# Patient Record
Sex: MALE | Race: WHITE | NOT HISPANIC OR LATINO | Employment: FULL TIME | URBAN - METROPOLITAN AREA
[De-identification: names, ages, dates, MRNs, and addresses within clinical notes are randomized per-mention and may not be internally consistent; named-entity substitution may affect disease eponyms.]

---

## 2022-09-10 ENCOUNTER — APPOINTMENT (OUTPATIENT)
Dept: CT IMAGING | Facility: HOSPITAL | Age: 42
End: 2022-09-10

## 2022-09-10 ENCOUNTER — HOSPITAL ENCOUNTER (EMERGENCY)
Facility: HOSPITAL | Age: 42
Discharge: HOME | End: 2022-09-10
Attending: EMERGENCY MEDICINE | Admitting: EMERGENCY MEDICINE

## 2022-09-10 VITALS
SYSTOLIC BLOOD PRESSURE: 139 MMHG | TEMPERATURE: 98.4 F | HEIGHT: 68 IN | WEIGHT: 190 LBS | DIASTOLIC BLOOD PRESSURE: 86 MMHG | RESPIRATION RATE: 15 BRPM | BODY MASS INDEX: 28.79 KG/M2 | OXYGEN SATURATION: 98 % | HEART RATE: 82 BPM

## 2022-09-10 DIAGNOSIS — R55 SYNCOPE AND COLLAPSE: Primary | ICD-10-CM

## 2022-09-10 LAB
ALBUMIN SERPL-MCNC: 3.8 G/DL (ref 3.5–5.2)
ALBUMIN/GLOB SERPL: 1.8 G/DL
ALP SERPL-CCNC: 98 U/L (ref 39–117)
ALT SERPL W P-5'-P-CCNC: 57 U/L (ref 1–41)
AMPHET+METHAMPHET UR QL: NEGATIVE
AMPHETAMINES UR QL: NEGATIVE
ANION GAP SERPL CALCULATED.3IONS-SCNC: 10.5 MMOL/L (ref 5–15)
AST SERPL-CCNC: 40 U/L (ref 1–40)
BACTERIA UR QL AUTO: ABNORMAL /HPF
BARBITURATES UR QL SCN: NEGATIVE
BASOPHILS # BLD AUTO: 0.06 10*3/MM3 (ref 0–0.2)
BASOPHILS NFR BLD AUTO: 0.7 % (ref 0–1.5)
BENZODIAZ UR QL SCN: NEGATIVE
BILIRUB SERPL-MCNC: 0.6 MG/DL (ref 0–1.2)
BILIRUB UR QL STRIP: NEGATIVE
BUN SERPL-MCNC: 21 MG/DL (ref 6–20)
BUN/CREAT SERPL: 21.6 (ref 7–25)
BUPRENORPHINE SERPL-MCNC: NEGATIVE NG/ML
CALCIUM SPEC-SCNC: 8.2 MG/DL (ref 8.6–10.5)
CANNABINOIDS SERPL QL: NEGATIVE
CHLORIDE SERPL-SCNC: 104 MMOL/L (ref 98–107)
CLARITY UR: CLEAR
CO2 SERPL-SCNC: 25.5 MMOL/L (ref 22–29)
COCAINE UR QL: NEGATIVE
COLOR UR: YELLOW
CREAT SERPL-MCNC: 0.97 MG/DL (ref 0.76–1.27)
DEPRECATED RDW RBC AUTO: 38.4 FL (ref 37–54)
EGFRCR SERPLBLD CKD-EPI 2021: 100.6 ML/MIN/1.73
EOSINOPHIL # BLD AUTO: 0.22 10*3/MM3 (ref 0–0.4)
EOSINOPHIL NFR BLD AUTO: 2.5 % (ref 0.3–6.2)
ERYTHROCYTE [DISTWIDTH] IN BLOOD BY AUTOMATED COUNT: 11.8 % (ref 12.3–15.4)
GLOBULIN UR ELPH-MCNC: 2.1 GM/DL
GLUCOSE SERPL-MCNC: 100 MG/DL (ref 65–99)
GLUCOSE UR STRIP-MCNC: NEGATIVE MG/DL
HCT VFR BLD AUTO: 43 % (ref 37.5–51)
HGB BLD-MCNC: 15 G/DL (ref 13–17.7)
HGB UR QL STRIP.AUTO: ABNORMAL
HYALINE CASTS UR QL AUTO: ABNORMAL /LPF
IMM GRANULOCYTES # BLD AUTO: 0.03 10*3/MM3 (ref 0–0.05)
IMM GRANULOCYTES NFR BLD AUTO: 0.3 % (ref 0–0.5)
KETONES UR QL STRIP: NEGATIVE
LEUKOCYTE ESTERASE UR QL STRIP.AUTO: NEGATIVE
LYMPHOCYTES # BLD AUTO: 2.16 10*3/MM3 (ref 0.7–3.1)
LYMPHOCYTES NFR BLD AUTO: 24.1 % (ref 19.6–45.3)
MCH RBC QN AUTO: 31.4 PG (ref 26.6–33)
MCHC RBC AUTO-ENTMCNC: 34.9 G/DL (ref 31.5–35.7)
MCV RBC AUTO: 90.1 FL (ref 79–97)
METHADONE UR QL SCN: NEGATIVE
MONOCYTES # BLD AUTO: 0.86 10*3/MM3 (ref 0.1–0.9)
MONOCYTES NFR BLD AUTO: 9.6 % (ref 5–12)
MUCOUS THREADS URNS QL MICRO: ABNORMAL /HPF
NEUTROPHILS NFR BLD AUTO: 5.62 10*3/MM3 (ref 1.7–7)
NEUTROPHILS NFR BLD AUTO: 62.8 % (ref 42.7–76)
NITRITE UR QL STRIP: NEGATIVE
NRBC BLD AUTO-RTO: 0 /100 WBC (ref 0–0.2)
OPIATES UR QL: NEGATIVE
OXYCODONE UR QL SCN: NEGATIVE
PCP UR QL SCN: NEGATIVE
PH UR STRIP.AUTO: 6 [PH] (ref 4.5–8)
PLATELET # BLD AUTO: 223 10*3/MM3 (ref 140–450)
PMV BLD AUTO: 10.1 FL (ref 6–12)
POTASSIUM SERPL-SCNC: 4 MMOL/L (ref 3.5–5.2)
PROPOXYPH UR QL: NEGATIVE
PROT SERPL-MCNC: 5.9 G/DL (ref 6–8.5)
PROT UR QL STRIP: NEGATIVE
RBC # BLD AUTO: 4.77 10*6/MM3 (ref 4.14–5.8)
RBC # UR STRIP: ABNORMAL /HPF
REF LAB TEST METHOD: ABNORMAL
SODIUM SERPL-SCNC: 140 MMOL/L (ref 136–145)
SP GR UR STRIP: 1.03 (ref 1–1.03)
SQUAMOUS #/AREA URNS HPF: ABNORMAL /HPF
TRICYCLICS UR QL SCN: NEGATIVE
TROPONIN T SERPL-MCNC: <0.01 NG/ML (ref 0–0.03)
UROBILINOGEN UR QL STRIP: ABNORMAL
WBC # UR STRIP: ABNORMAL /HPF
WBC NRBC COR # BLD: 8.95 10*3/MM3 (ref 3.4–10.8)

## 2022-09-10 PROCEDURE — 81001 URINALYSIS AUTO W/SCOPE: CPT | Performed by: EMERGENCY MEDICINE

## 2022-09-10 PROCEDURE — 85025 COMPLETE CBC W/AUTO DIFF WBC: CPT | Performed by: EMERGENCY MEDICINE

## 2022-09-10 PROCEDURE — 93005 ELECTROCARDIOGRAM TRACING: CPT

## 2022-09-10 PROCEDURE — 70450 CT HEAD/BRAIN W/O DYE: CPT

## 2022-09-10 PROCEDURE — 80053 COMPREHEN METABOLIC PANEL: CPT | Performed by: EMERGENCY MEDICINE

## 2022-09-10 PROCEDURE — 99284 EMERGENCY DEPT VISIT MOD MDM: CPT

## 2022-09-10 PROCEDURE — 80306 DRUG TEST PRSMV INSTRMNT: CPT | Performed by: EMERGENCY MEDICINE

## 2022-09-10 PROCEDURE — 36415 COLL VENOUS BLD VENIPUNCTURE: CPT

## 2022-09-10 PROCEDURE — 84484 ASSAY OF TROPONIN QUANT: CPT | Performed by: EMERGENCY MEDICINE

## 2022-09-10 PROCEDURE — 93010 ELECTROCARDIOGRAM REPORT: CPT | Performed by: INTERNAL MEDICINE

## 2022-09-11 LAB — QT INTERVAL: 395 MS

## 2022-09-11 NOTE — ED PROVIDER NOTES
Subjective   PIT  History of Present Illness    Chief complaint: Syncopal episode    Location: Home    Quality/Severity: Full syncopal episode    Timing/Onset/Duration: Early Thursday morning    Modifying Factors: No further episode    Associated Symptoms: Patient complains of some bruising to the right periocular area.  No headache.  Patient complains of chronic back pain that is no worse than usual.  No neck pain no chest pain or shortness of breath.  No abdominal pain.  No numbness, tingling, weakness, or change in bladder function.  Patient did notice that he had had a mild loss of fecal continence.  He complains of some mild right lip tenderness.    Narrative: This 41-year-old had a syncopal episode on Thursday approximately 3 AM on the way to the restroom.  Patient states that he hit his right, lip, and elbow.  The patient had driven all night, 16 hours, from [place] and consumed a lot of caffeine.  He got into a disagreement with his girlfriend and then began drinking beer.  He went to get up out of the bed to go to the bathroom and had a syncopal episode.  The patient has a history of accelerated junctional rhythm for which she takes a low-dose of propranolol.  The patient has an appointment with a primary care provider later in the month.    PCP: No known provider    History of Present Illness     Medication List      You have not been prescribed any medications.         Review of Systems   Constitutional: Negative for chills and fever.   HENT: Negative for congestion, ear pain, nosebleeds, rhinorrhea, sore throat and trouble swallowing.    Eyes: Negative for visual disturbance.   Respiratory: Negative for cough and shortness of breath.    Cardiovascular: Negative for chest pain.   Gastrointestinal: Negative for abdominal pain, diarrhea and vomiting.   Genitourinary: Negative for difficulty urinating.   Musculoskeletal: Negative for back pain and neck pain.   Skin: Negative for wound.   Neurological:  Negative for weakness, numbness and headaches.   Psychiatric/Behavioral: Negative for confusion.       History reviewed. No pertinent past medical history.    No Known Allergies    History reviewed. No pertinent surgical history.    History reviewed. No pertinent family history.    Social History     Socioeconomic History   • Marital status: Single   Tobacco Use   • Smoking status: Never Smoker   Vaping Use   • Vaping Use: Never used   Substance and Sexual Activity   • Alcohol use: Not Currently   • Drug use: Never           Objective   Physical Exam  Vitals (The temperature is 98.4 °F, pulse 67, respirations 23, /105, room air pulse ox 96%) and nursing note reviewed.   Constitutional:       Appearance: Normal appearance.   HENT:      Head: Normocephalic.      Right Ear: Tympanic membrane normal.      Left Ear: Tympanic membrane normal.      Nose: Nose normal. No rhinorrhea.      Mouth/Throat:      Mouth: Mucous membranes are moist.   Eyes:      Extraocular Movements: Extraocular movements intact.      Pupils: Pupils are equal, round, and reactive to light.      Comments: There is mild bruising to the right periocular area.   Neck:      Comments: There is no tenderness, deformity, or bony step-offs upon palpation the cervical, thoracic, lumbar sacrococcygeal spine  Cardiovascular:      Rate and Rhythm: Normal rate and regular rhythm.      Pulses: Normal pulses.      Heart sounds: Normal heart sounds. No murmur heard.    No friction rub. No gallop.   Pulmonary:      Effort: Pulmonary effort is normal.      Breath sounds: Normal breath sounds.   Abdominal:      General: Abdomen is flat. Bowel sounds are normal. There is no distension.      Palpations: There is no mass.      Tenderness: There is no abdominal tenderness. There is no guarding or rebound.      Hernia: No hernia is present.   Musculoskeletal:         General: No swelling, tenderness or deformity. Normal range of motion.      Cervical back: Normal  range of motion and neck supple.      Right lower leg: No edema.      Left lower leg: No edema.      Comments: Extremities are neurovascularly intact with normal range of motion and no joint laxity noted.   Skin:     General: Skin is warm and dry.      Capillary Refill: Capillary refill takes less than 2 seconds.   Neurological:      General: No focal deficit present.      Mental Status: He is alert and oriented to person, place, and time.      Cranial Nerves: No cranial nerve deficit.      Sensory: No sensory deficit.      Motor: No weakness.      Coordination: Coordination normal.   Psychiatric:         Mood and Affect: Mood normal.         Procedures           ED Course  ED Course as of 09/10/22 2228   Sat Sep 10, 2022   2227 The laboratory values reviewed by me.  The BUN is 21 and calcium is 8.2.  The total protein is 5.9.  The ALT is 57.  The serum glucose is 100.  The urine specific gravity is 1.032.  The urine microscopic shows 6-12 red blood cells, 0-2 white blood cells, moderate mucus.  The laboratory values are otherwise unremarkable [RC]      ED Course User Index  [RC] Tang Lund MD      20:28 EDT, 09/10/22:  The EKG was obtained at 1947 read by me at 1949.  EKG shows a normal sinus rhythm with rate of 70.  There is a normal axis with no hypertrophy.  The MI, QRS, and QT intervals are unremarkable.  There is no ectopy.  There is no acute ST elevation or depression.     22:28 EDT, 09/10/22:  The orthostatics are negative    22:33 EDT, 09/10/22:  The patient's diagnosis of syncope was discussed with him.  The patient's etiology of the syncope was most likely dehydration, stress and alcohol induced in combination with caffeine and sleep deprivation.  The patient has been advised to manage his stress.  Drink plenty of fluids.  He should follow-up with his primary care provider within 1 week.  He should return to the emergency department if he is worse in any way at all.  All the patient's questions  were answered he will be discharged in good condition for                                [facility]    Final diagnoses:   Syncope and collapse       ED Disposition  ED Disposition     None          No follow-up provider specified.       Medication List      No changes were made to your prescriptions during this visit.          Tang Lund MD  09/10/22 [id number]

## 2022-09-11 NOTE — DISCHARGE INSTRUCTIONS
Rest.  Drink plenty of fluids.  Eat a balanced healthy diet and decrease your stress.  Follow-up with your primary care provider within 1 week or call the patient connection line Monday morning for primary care provider to follow-up with within 1 week.  Return to the emergency department if you are worse in any way at all.

## 2022-09-21 ENCOUNTER — OFFICE VISIT (OUTPATIENT)
Dept: FAMILY MEDICINE CLINIC | Facility: CLINIC | Age: 42
End: 2022-09-21

## 2022-09-21 ENCOUNTER — PATIENT ROUNDING (BHMG ONLY) (OUTPATIENT)
Dept: FAMILY MEDICINE CLINIC | Facility: CLINIC | Age: 42
End: 2022-09-21

## 2022-09-21 VITALS
HEIGHT: 68 IN | SYSTOLIC BLOOD PRESSURE: 124 MMHG | HEART RATE: 78 BPM | DIASTOLIC BLOOD PRESSURE: 68 MMHG | TEMPERATURE: 97.3 F | WEIGHT: 201 LBS | OXYGEN SATURATION: 98 % | BODY MASS INDEX: 30.46 KG/M2

## 2022-09-21 DIAGNOSIS — M54.50 CHRONIC BILATERAL LOW BACK PAIN WITHOUT SCIATICA: Primary | ICD-10-CM

## 2022-09-21 DIAGNOSIS — G89.29 CHRONIC BILATERAL LOW BACK PAIN WITHOUT SCIATICA: Primary | ICD-10-CM

## 2022-09-21 DIAGNOSIS — I49.8 ACCELERATED JUNCTIONAL RHYTHM: ICD-10-CM

## 2022-09-21 PROCEDURE — 99203 OFFICE O/P NEW LOW 30 MIN: CPT | Performed by: FAMILY MEDICINE

## 2022-09-21 RX ORDER — PROPRANOLOL HYDROCHLORIDE 20 MG/1
TABLET ORAL
COMMUNITY
End: 2022-09-21 | Stop reason: SDUPTHER

## 2022-09-21 RX ORDER — PROPRANOLOL HYDROCHLORIDE 20 MG/1
20 TABLET ORAL DAILY PRN
Qty: 90 TABLET | Refills: 1 | Status: SHIPPED | OUTPATIENT
Start: 2022-09-21

## 2022-09-21 NOTE — PROGRESS NOTES
Chief Complaint   Patient presents with   • Back Pain   • Establish Care       Subjective   Sourav Brambila is a 41 y.o. male.     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 Has been established with Cardiology in the past    Chronic LBP: Intermittent. Currently doing well. In lumbar region, no associated with radiculopathy, weakness etc. Very active. Worsens at times with increase in exercise regimens    No other significant PMH. No other prescription meds. Had ER visit on 9/10, he denies any alcohol use had situational life stressor. Possible syncopal episode, unclear. He was very dehydrated at the time. No recurrences and feels well. No other history of this. Had normal EKG. Neg cardiac labs.       The following portions of the patient's history were reviewed and updated as appropriate: allergies, current medications, past family history, past medical history, past social history, past surgical history and problem list.      Past Medical History:   Diagnosis Date   • Allergic    • History of medical problems 3/1/2020    Heart arrhythmia       Past Surgical History:   Procedure Laterality Date   • FRACTURE SURGERY  2016   • JOINT REPLACEMENT  2019       No family history on file.    Social History     Socioeconomic History   • Marital status: Single   Tobacco Use   • Smoking status: Never Smoker   • Smokeless tobacco: Never Used   Vaping Use   • Vaping Use: Never used   Substance and Sexual Activity   • Alcohol use: Not Currently   • Drug use: Never   • Sexual activity: Not Currently     Partners: Female     Birth control/protection: Condom       No current outpatient medications on file prior to visit.     No current facility-administered medications on file prior to visit.       Review of Systems   HENT: Negative for congestion.    Respiratory: Negative for shortness of breath.    Cardiovascular: Negative for chest  pain.   Gastrointestinal: Negative for abdominal pain.   Neurological: Negative for weakness.   Psychiatric/Behavioral: Negative for depressed mood.           Vitals:    09/21/22 0820   BP: 124/68   Pulse: 78   Temp: 97.3 °F (36.3 °C)   SpO2: 98%      Objective   Physical Exam  Vitals reviewed.   Cardiovascular:      Rate and Rhythm: Normal rate.      Pulses: Normal pulses.   Pulmonary:      Effort: Pulmonary effort is normal.   Abdominal:      General: Abdomen is flat.   Skin:     Capillary Refill: Capillary refill takes less than 2 seconds.   Neurological:      General: No focal deficit present.      Mental Status: He is alert.           Assessment & Plan   Problems Addressed this Visit    None     Visit Diagnoses     Chronic bilateral low back pain without sciatica    -  Primary    Relevant Orders    Ambulatory Referral to Physical Therapy Evaluate and treat; Soft Tissue Mobilizaton; Stretching, ROM, Strengthening    Accelerated junctional rhythm        Relevant Medications    propranolol (INDERAL) 20 MG tablet    Other Relevant Orders    Ambulatory Referral to Cardiology      Diagnoses       Codes Comments    Chronic bilateral low back pain without sciatica    -  Primary ICD-10-CM: M54.50, G89.29  ICD-9-CM: 724.2, 338.29     Accelerated junctional rhythm     ICD-10-CM: I49.8  ICD-9-CM: 427.89         Would establish with [facility] in light of ER visit   [facility] physical therapy referral  3 mo F/u to reassess back concerns         Meaghan Eduardo MD

## 2022-10-12 ENCOUNTER — TELEPHONE (OUTPATIENT)
Dept: FAMILY MEDICINE CLINIC | Facility: CLINIC | Age: 42
End: 2022-10-12

## 2022-10-12 NOTE — TELEPHONE ENCOUNTER
Caller: Sourav Brambila    Relationship: Self    Best call back number: [phone]    What medication are you requesting: KLONAZEPAM 1 MG TAB    What are your current symptoms: ANXIETY    How long have you been experiencing symptoms: ONGOING --WAS TAKING PRN/LAST GIVEN BY PSYCHIATRIST IN [place].     Have you had these symptoms before:    [x] Yes  [] No    Have you been treated for these symptoms before:   [x] Yes  [] No    If a prescription is needed, what is your preferred pharmacy and phone number: [id number] - [place] [address] AT [facility] & [address] - [phone]  - [phone] FX     Additional notes:  PATIENT HAS NOT MENTIONED TO PROVIDER BUT IS HAVING A FLARE OF ANXIETY AND IS WONDERING IF SHE CAN FILL FOR HIM.

## 2022-10-13 DIAGNOSIS — F41.1 GAD (GENERALIZED ANXIETY DISORDER): Primary | ICD-10-CM

## 2022-10-13 RX ORDER — CLONAZEPAM 0.5 MG/1
0.5 TABLET ORAL DAILY PRN
Qty: 10 TABLET | Refills: 0 | Status: SHIPPED | OUTPATIENT
Start: 2022-10-13

## 2022-10-13 NOTE — TELEPHONE ENCOUNTER
I could do short term three day supply if it is an old prescription for him. Likely better at a lower dose. It is only meant for prn use, not long term. Anything more than that he needs an appointment to discuss and go over per [facility] protocol

## 2022-10-26 ENCOUNTER — OFFICE VISIT (OUTPATIENT)
Dept: FAMILY MEDICINE CLINIC | Facility: CLINIC | Age: 42
End: 2022-10-26

## 2022-10-26 VITALS
WEIGHT: 206 LBS | DIASTOLIC BLOOD PRESSURE: 60 MMHG | OXYGEN SATURATION: 99 % | HEIGHT: 68 IN | TEMPERATURE: 97.8 F | SYSTOLIC BLOOD PRESSURE: 124 MMHG | BODY MASS INDEX: 31.22 KG/M2 | HEART RATE: 59 BPM

## 2022-10-26 DIAGNOSIS — R23.2 FACIAL FLUSHING: ICD-10-CM

## 2022-10-26 DIAGNOSIS — R00.2 HEART PALPITATIONS: Primary | ICD-10-CM

## 2022-10-26 DIAGNOSIS — R61 NIGHT SWEATS: ICD-10-CM

## 2022-10-26 PROCEDURE — 99214 OFFICE O/P EST MOD 30 MIN: CPT | Performed by: FAMILY MEDICINE

## 2022-10-26 NOTE — PROGRESS NOTES
Chief Complaint   Patient presents with   • Anxiety       Subjective   Sourav Brambila is a 41 y.o. male.     History of Present Illness   42 yo M here for follow up visit     PMH palpitations, accelerated junctional rhytm has been well controlled with propranolol. Has been established with Cardiology in the past     He also cites a hx of a flare up of heart palpitations associated with other symptoms that had occurred every 3 months or so. Although he had thought this had resolved until it returned    During episodes: Has sweating and feeling of adrenaline. Some palpitations. Flushed face. Sweats. Lasts several days and then back to normal. No precipitating event. No known triggers. States he had thyroid lab work that was normal, normal blood sugars, renal function, blood counts. No clear underlying cause found. Had seen his Cardiologist as well.     Prior pcp considered Endocrine referral rule out any cause there. Short term dose clonazepam resolved episodes temporarily. He was recommended to see Psych which he did, they did not give him a depression or panic/mimi dx    The following portions of the patient's history were reviewed and updated as appropriate: allergies, current medications, past family history, past medical history, past social history, past surgical history and problem list.      Past Medical History:   Diagnosis Date   • Allergic    • History of medical problems 3/1/2020    Heart arrhythmia       Past Surgical History:   Procedure Laterality Date   • FRACTURE SURGERY  2016   • JOINT REPLACEMENT  2019       No family history on file.    Social History     Socioeconomic History   • Marital status: Single   Tobacco Use   • Smoking status: Never   • Smokeless tobacco: Never   Vaping Use   • Vaping Use: Never used   Substance and Sexual Activity   • Alcohol use: Not Currently   • Drug use: Never   • Sexual activity: Not Currently     Partners: Female     Birth control/protection: Condom        Current Outpatient Medications on File Prior to Visit   Medication Sig Dispense Refill   • clonazePAM (KlonoPIN) 0.5 MG tablet Take 1 tablet by mouth Daily As Needed for Anxiety. 10 tablet 0   • propranolol (INDERAL) 20 MG tablet Take 1 tablet by mouth Daily As Needed (HEART PALPITATIONS). 90 tablet 1     No current facility-administered medications on file prior to visit.       Review of Systems   Constitutional: Negative for fever.   HENT: Negative for congestion.    Respiratory: Negative for cough.    Cardiovascular: Negative for chest pain.   Neurological: Negative for weakness.   Psychiatric/Behavioral: Negative for depressed mood and stress.           Vitals:    10/26/22 1454   BP: 124/60   Pulse: 59   Temp: 97.8 °F (36.6 °C)   SpO2: 99%      Objective   Physical Exam  Vitals reviewed.   Cardiovascular:      Rate and Rhythm: Normal rate and regular rhythm.      Pulses: Normal pulses.   Pulmonary:      Effort: Pulmonary effort is normal.   Skin:     General: Skin is warm.      Capillary Refill: Capillary refill takes less than 2 seconds.   Neurological:      Mental Status: He is alert.           Assessment & Plan   Problems Addressed this Visit    None  Visit Diagnoses     Heart palpitations    -  Primary    Facial flushing        Night sweats          Diagnoses       Codes Comments    Heart palpitations    -  Primary ICD-10-CM: R00.2  ICD-9-CM: 785.1     Facial flushing     ICD-10-CM: R23.2  ICD-9-CM: 782.62     Night sweats     ICD-10-CM: R61  ICD-9-CM: 780.8           -Labs from 2021 with thyroid, blood sugars, CBC, CMP were normal  -If this does become part of a pattern again he could consider Endo referral  -He is already establishing with Cards for accelerated junctional rhytm      Meaghan Eduardo MD

## 2022-11-07 ENCOUNTER — OFFICE VISIT (OUTPATIENT)
Dept: CARDIOLOGY | Facility: CLINIC | Age: 42
End: 2022-11-07

## 2022-11-07 VITALS
HEART RATE: 54 BPM | SYSTOLIC BLOOD PRESSURE: 118 MMHG | WEIGHT: 200 LBS | DIASTOLIC BLOOD PRESSURE: 88 MMHG | HEIGHT: 68 IN | BODY MASS INDEX: 30.31 KG/M2

## 2022-11-07 DIAGNOSIS — R00.2 PALPITATIONS: Primary | ICD-10-CM

## 2022-11-07 PROCEDURE — 93000 ELECTROCARDIOGRAM COMPLETE: CPT | Performed by: INTERNAL MEDICINE

## 2022-11-07 PROCEDURE — 99203 OFFICE O/P NEW LOW 30 MIN: CPT | Performed by: INTERNAL MEDICINE

## 2022-11-07 NOTE — PROGRESS NOTES
PATIENTINFORMATION    Date of Office Visit: 2022  Encounter Provider: Ricky De Anda MD  Place of Service: [facility] CARDIOLOGY  Patient Name: Sourav Brambila  : 1980    Subjective:     Encounter Date:2022      Patient ID: Sourav Brambila is a 41 y.o. male.    Chief Complaint   Patient presents with   • new patient     HPI  Mr. Brambila is a pleasant 40 yo gentleman who came to cardiology clinic for evaluation of.  Palpitations.  He started having palpitations back in 2020 while he was hiking on the mountains in [place].  He describes episodes as fluctuating heart racing and slowing and giving him a sense of panic attack.  He had extensive work-up including the ER and then by cardiologist.  He was told he has accelerated junctional rhythm.   He also reports intermittent episodes of spells where he feels fatigued, tired, sleepless with exercise intolerance for last for days but overall has improved in .  He was seen by endocrinologist to rule out adrenal tumors.    Palpitations occur few times a week and last for 1-2 minutes. No known exacerbating or relieving factors.    ROS  All systems reviewed and negative except as noted in HPI.    Past Medical History:   Diagnosis Date   • Allergic    • History of medical problems 3/1/2020    Heart arrhythmia       Past Surgical History:   Procedure Laterality Date   • FRACTURE SURGERY  2016   • JOINT REPLACEMENT  2019       Social History     Socioeconomic History   • Marital status: Single   Tobacco Use   • Smoking status: Never   • Smokeless tobacco: Never   Vaping Use   • Vaping Use: Never used   Substance and Sexual Activity   • Alcohol use: Not Currently   • Drug use: Never   • Sexual activity: Not Currently     Partners: Female     Birth control/protection: Condom       History reviewed. No pertinent family history.        ECG 12 Lead    Date/Time: 2022 3:19 PM  Performed by: Ricky De Anda MD  Authorized  "by: Ricky De Anda MD   Comparison: compared with previous ECG from 9/10/2022  Similar to previous ECG  Rhythm: sinus rhythm  Rate: normal  Conduction: conduction normal  ST Segments: ST segments normal  T Waves: T waves normal  QRS axis: normal  Other: no other findings    Clinical impression: normal ECG               Objective:     /88 (BP Location: Right arm, Patient Position: Sitting)   Pulse 54   Ht 172.7 cm (68\")   Wt 90.7 kg (200 lb)   BMI 30.41 kg/m²  Body mass index is 30.41 kg/m².     Constitutional:       General: Not in acute distress.     Appearance: Well-developed. Not diaphoretic.   Eyes:      Pupils: Pupils are equal, round, and reactive to light.   HENT:      Head: Normocephalic and atraumatic.   Neck:      Thyroid: No thyromegaly.   Pulmonary:      Effort: Pulmonary effort is normal. No respiratory distress.      Breath sounds: Normal breath sounds. No wheezing. No rales.   Chest:      Chest wall: Not tender to palpatation.   Cardiovascular:      Normal rate. Regular rhythm.      No gallop.   Pulses:     Intact distal pulses.   Edema:     Peripheral edema absent.   Abdominal:      General: Bowel sounds are normal. There is no distension.      Palpations: Abdomen is soft.      Tenderness: There is no guarding.   Musculoskeletal: Normal range of motion.         General: No deformity.      Cervical back: Normal range of motion and neck supple. Skin:     General: Skin is warm and dry.      Findings: No rash.   Neurological:      Mental Status: Alert and oriented to person, place, and time.      Cranial Nerves: No cranial nerve deficit.      Deep Tendon Reflexes: Reflexes are normal and symmetric.   Psychiatric:         Judgment: Judgment normal.         Review Of Data: I have reviewed pertinent recent labs, images and documents and pertinent findings included in HPI or assessment below.          Assessment/Plan:         1. Palpitations -no presyncope or syncope  He was told he had " accelerated junctional rhythm in the past.  Occur few times a week  I will send him on an event monitor   Encouraged him to continue exercise  Go to lab for pheochromocytoma rule out during prolonged  Spells     Palpitations    Diagnosis and plan of care discussed with patient and verbalized understanding.            Your medication list          Accurate as of November 7, 2022  3:42 PM. If you have any questions, ask your nurse or doctor.            CONTINUE taking these medications      Instructions Last Dose Given Next Dose Due   clonazePAM 0.5 MG tablet  Commonly known as: KlonoPIN      Take 1 tablet by mouth Daily As Needed for Anxiety.       propranolol 20 MG tablet  Commonly known as: INDERAL      Take 1 tablet by mouth Daily As Needed (HEART PALPITATIONS).                  Ricky De Anda MD  11/07/22  15:42 EST

## 2022-12-14 ENCOUNTER — OFFICE VISIT (OUTPATIENT)
Dept: FAMILY MEDICINE CLINIC | Facility: CLINIC | Age: 42
End: 2022-12-14

## 2022-12-14 ENCOUNTER — HOSPITAL ENCOUNTER (OUTPATIENT)
Dept: GENERAL RADIOLOGY | Facility: HOSPITAL | Age: 42
Discharge: HOME | End: 2022-12-14
Admitting: FAMILY MEDICINE

## 2022-12-14 VITALS
TEMPERATURE: 97.5 F | BODY MASS INDEX: 30.31 KG/M2 | WEIGHT: 200 LBS | HEIGHT: 68 IN | HEART RATE: 76 BPM | DIASTOLIC BLOOD PRESSURE: 82 MMHG | SYSTOLIC BLOOD PRESSURE: 112 MMHG | OXYGEN SATURATION: 98 %

## 2022-12-14 DIAGNOSIS — G89.29 CHRONIC BILATERAL LOW BACK PAIN WITHOUT SCIATICA: Primary | ICD-10-CM

## 2022-12-14 DIAGNOSIS — M54.50 CHRONIC BILATERAL LOW BACK PAIN WITHOUT SCIATICA: ICD-10-CM

## 2022-12-14 DIAGNOSIS — G89.29 CHRONIC BILATERAL LOW BACK PAIN WITHOUT SCIATICA: ICD-10-CM

## 2022-12-14 DIAGNOSIS — M54.50 CHRONIC BILATERAL LOW BACK PAIN WITHOUT SCIATICA: Primary | ICD-10-CM

## 2022-12-14 PROCEDURE — 72100 X-RAY EXAM L-S SPINE 2/3 VWS: CPT

## 2022-12-14 PROCEDURE — 99214 OFFICE O/P EST MOD 30 MIN: CPT | Performed by: FAMILY MEDICINE

## 2022-12-14 NOTE — PROGRESS NOTES
Chief Complaint   Patient presents with   • Back Pain     Follow-up         Subjective   Sourav Brambila is a 42 y.o. male.     History of Present Illness Here for follow up on chronic lumbar back pain    PMH palpitations, accelerated junctional rhytm has been well controlled with propranolol. Has been established with Cardiology in the past (recently re-established in [place] and is awaiting heart monitor results)    Chronic LBP: Intermittent. In lumbar region feels tender, no associated with radiculopathy, weakness etc. Very active. Worsens at times with increase in exercise regimens like running. Is going to [facility] therapy. Stretches help his pain. Has been ongoing for years now. No Xray on file.        The following portions of the patient's history were reviewed and updated as appropriate: allergies, current medications, past family history, past medical history, past social history, past surgical history and problem list.      Past Medical History:   Diagnosis Date   • Allergic    • History of medical problems 3/1/2020    Heart arrhythmia       Past Surgical History:   Procedure Laterality Date   • FRACTURE SURGERY  2016   • JOINT REPLACEMENT  2019       No family history on file.    Social History     Socioeconomic History   • Marital status: Single   Tobacco Use   • Smoking status: Never   • Smokeless tobacco: Never   Vaping Use   • Vaping Use: Never used   Substance and Sexual Activity   • Alcohol use: Not Currently   • Drug use: Never   • Sexual activity: Not Currently     Partners: Female     Birth control/protection: Condom       Current Outpatient Medications on File Prior to Visit   Medication Sig Dispense Refill   • clonazePAM (KlonoPIN) 0.5 MG tablet Take 1 tablet by mouth Daily As Needed for Anxiety. 10 tablet 0   • propranolol (INDERAL) 20 MG tablet Take 1 tablet by mouth Daily As Needed (HEART PALPITATIONS). 90 tablet 1     No current facility-administered medications on file prior to  visit.       Review of Systems   Constitutional: Negative for fever.   HENT: Negative for congestion.    Respiratory: Negative for cough.    Cardiovascular: Negative for chest pain.   Gastrointestinal: Negative for abdominal pain.   Musculoskeletal: Positive for back pain.   Psychiatric/Behavioral: Negative for depressed mood.           Vitals:    12/14/22 1451   BP: 112/82   Pulse: 76   Temp: 97.5 °F (36.4 °C)   SpO2: 98%      Objective   Physical Exam  Vitals reviewed.   Cardiovascular:      Pulses: Normal pulses.      Heart sounds: Normal heart sounds.   Pulmonary:      Effort: Pulmonary effort is normal.   Abdominal:      General: Abdomen is flat.   Skin:     General: Skin is warm.      Capillary Refill: Capillary refill takes less than 2 seconds.   Neurological:      General: No focal deficit present.      Mental Status: He is alert.   Psychiatric:         Mood and Affect: Mood normal.           Assessment & Plan   Problems Addressed this Visit    None  Visit Diagnoses     Chronic bilateral low back pain without sciatica    -  Primary    Relevant Orders    XR Spine Lumbar 2 or 3 View      Diagnoses       Codes Comments    Chronic bilateral low back pain without sciatica    -  Primary ICD-10-CM: M54.50, G89.29  ICD-9-CM: 724.2, 338.29         Increase daily stretching regimen, stretch hip flexors, hamstrings etc  Xray today lumbar spine      Meaghan Eduardo MD

## 2022-12-20 DIAGNOSIS — F41.1 GAD (GENERALIZED ANXIETY DISORDER): ICD-10-CM

## 2022-12-21 RX ORDER — CLONAZEPAM 0.5 MG/1
0.5 TABLET ORAL DAILY PRN
Qty: 10 TABLET | OUTPATIENT
Start: 2022-12-21

## 2023-04-03 ENCOUNTER — OFFICE VISIT (OUTPATIENT)
Dept: ENDOCRINOLOGY | Age: 43
End: 2023-04-03
Payer: COMMERCIAL

## 2023-04-03 VITALS
WEIGHT: 211.6 LBS | SYSTOLIC BLOOD PRESSURE: 118 MMHG | HEIGHT: 68 IN | HEART RATE: 89 BPM | BODY MASS INDEX: 32.07 KG/M2 | TEMPERATURE: 97.1 F | DIASTOLIC BLOOD PRESSURE: 68 MMHG | OXYGEN SATURATION: 99 %

## 2023-04-03 DIAGNOSIS — R00.2 PALPITATIONS: Primary | ICD-10-CM

## 2023-04-03 PROCEDURE — 99203 OFFICE O/P NEW LOW 30 MIN: CPT | Performed by: INTERNAL MEDICINE

## 2023-04-03 NOTE — PROGRESS NOTES
New Patient      Chief Complaint    Chief Complaint   Patient presents with   • Palpitations     Facial flush         HPI:   Sourav Brambila is a 42 y.o. male sent to us for consultative evaluation and management of palpitations.  He stated that he was going about his day as a  when out of nowhere he developed sudden increased heart palpitations sometime in March 2020.  He was doing [employer] at the time.  The symptoms were constant for a period of about 6 months to a year.  He went to the emergency room and was told that he had a panic attack.  He has been evaluated extensively since then by cardiology and has had an echocardiogram and worn a Holter monitor for 1 month.  He was given a trial of propranolol and clonazepam to take as needed and does seem to control his symptoms.  It went for a long time without any of the symptoms but then had a flareup sometime in September 2022.  He was told by cardiology that he has an accelerated junctional rhythm.  However, since this year he has not been taking any of the medications and he has not had a recurrence of those symptoms.  He does not have any history or symptoms suggestive of hypothyroidism.  His last thyroid function tests checked on June 14, 2021 were normal with a TSH of 1.67 uIU/mL and a free T4 of 1.41 ng/dL.  He does not have any features to suggest pheochromocytoma and he does not have any symptoms to suggest carcinoid syndrome.  His blood pressure has consistently been normal and today in the office it was 118/68 mmHg with a heart rate of 89 bpm.  He used to exercise regularly running 5 miles every morning before going to work but he has had to stop that because of concern about possibly triggering the symptoms.  He has a history of use of clomiphene to boost his testosterone levels but he stopped doing that after he started having the symptoms.  He is single does not have any children.  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  He drinks alcohol on social occasions.  His expectation on this visit was to rule out any possible endocrine connection and maybe get an image of the adrenal gland.    Past Medical History:   Diagnosis Date   • Allergic    • History of medical problems 3/1/2020    Heart arrhythmia          ROS:  Pertinent to this visit, only as mentioned above.  The rest was negative.    Social History     Socioeconomic History   • Marital status: Single   Tobacco Use   • Smoking status: Never   • Smokeless tobacco: Never   Vaping Use   • Vaping Use: Never used   Substance and Sexual Activity   • Alcohol use: Not Currently   • Drug use: Never   • Sexual activity: Not Currently     Partners: Female     Birth control/protection: Condom     Physical Exam:  GENERAL: He looked well and well-built  HEENT: Normal examination and without any evidence of Graves' eye disease.  NECK: Normal examination without any palpable thyroid enlargement or discrete thyroid nodules  LUNGS: Clear to auscultation bilaterally  CVS: RRR  EXTREMITIES: Normal examination.    Assessment:  1.  Symptoms of episodic palpitations, in a patient who does not have any history of hyperthyroidism, or features suggestive of pheochromocytoma or carcinoid syndrome.    Diagnoses and all orders for this visit:    1. Palpitations (Primary)  -     TSH  -     T4, Free  -     T3, Free  -     Metanephrines, Frac. Free, Plasma  -     5 HIAA, Urine, Quantitative, 24 Hour - Urine, Clean Catch    Recommendations:  1.  We had a long discussion with Mr. Brambila in which we reviewed his symptoms in detail, exploring to see if there is any possible endocrine connection but none was immediately available.  2.  Nonetheless, we do recommend to screen with thyroid function tests, fractionated plasma metanephrine levels and a 24-hour urine 5 HIAA.  3.  We discussed the fact that if this test comes back normal, there would be no indication to image the adrenal gland and no other  endocrinology intervention would be indicated.  He would therefore follow-up with his primary care physician.  4.  We gave him the opportunity to ask questions, which we answered and we addressed his concerns.

## 2023-04-04 ENCOUNTER — PATIENT ROUNDING (BHMG ONLY) (OUTPATIENT)
Dept: ENDOCRINOLOGY | Age: 43
End: 2023-04-04
Payer: COMMERCIAL

## 2023-04-08 LAB
METANEPH FREE SERPL-MCNC: 24.7 PG/ML (ref 0–88)
NORMETANEPHRINE SERPL-MCNC: 31.2 PG/ML (ref 0–218.9)
T3FREE SERPL-MCNC: 4 PG/ML (ref 2–4.4)
T4 FREE SERPL-MCNC: 1.17 NG/DL (ref 0.93–1.7)
TSH SERPL DL<=0.005 MIU/L-ACNC: 2.02 UIU/ML (ref 0.27–4.2)

## 2023-04-10 DIAGNOSIS — R00.2 PALPITATIONS: Primary | ICD-10-CM

## 2023-04-10 DIAGNOSIS — R00.2 PALPITATIONS: ICD-10-CM

## 2023-04-16 LAB
5OH-INDOLEACETATE 24H UR-MCNC: 1.9 MG/L
5OH-INDOLEACETATE 24H UR-MRATE: 4.3 MG/24 HR (ref 0–14.9)

## 2023-10-02 NOTE — PROGRESS NOTES
"Chief Complaint  Establish Care and Palpitations (Med refill)    Subjective          Sourav Brambila presents to [facility]  History of Present Illness  He is here to establish care. He is a former patient of Dr. Eduardo in [place]. He recently moved to this area in February.     He was working [employer] in 2020 and had a panic attack. He started to have tunnel vision and heart racing.  He went to the ER, and they told him that everything checked out.  He had seen cardiology and underwent testing and was told that he had accelerated junctional arrythmia.  He was given a trial of flecainide and CCB, but it did not help with the palpitations.  His palpitations have gotten better through the years slowly.  He was started on propanolol, which does work with his palpitations.  He is currently taking 20 mg daily as needed, but he rarely has to take it.  He has undergone extensive work-up through both cardiology and endocrinology due to his symptoms.  He reports flare-ups twice yearly with these palpitations.  When he was in [place], cardiologist recommended to see psychiatrist, as he felt some of it could be related to stress and anxiety.  He reports he went to the for psychiatrist, and told him that there is nothing concerning seen, so he wanted a second opinion.  He was placed on clonazepam as needed by Cassie Queen.  He currently sees her every 6 months or so.  She has prescribed him clonazepam, which he rarely takes.  He is going to find a therapist.  He did have an issue drinking, and went to the hospital May.  He had lost a good body of his was having a hard time to dealing with it.  Since then, he has stopped drinking.    Objective   Vital Signs:   /82 (BP Location: Left arm, Patient Position: Sitting)   Pulse 73   Ht 172.7 cm (68\")   Wt 97.3 kg (214 lb 6.4 oz)   BMI 32.60 kg/m²     Physical Exam  Constitutional:       General: He is not in acute distress.     " Appearance: Normal appearance. He is normal weight.   HENT:      Head: Normocephalic.   Eyes:      Pupils: Pupils are equal, round, and reactive to light.      Visual Fields: Right eye visual fields normal and left eye visual fields normal.   Neck:      Trachea: Trachea normal.   Cardiovascular:      Rate and Rhythm: Normal rate and regular rhythm.      Heart sounds: Normal heart sounds.   Pulmonary:      Effort: Pulmonary effort is normal.      Breath sounds: Normal breath sounds and air entry.   Musculoskeletal:      Right lower leg: No edema.      Left lower leg: No edema.   Skin:     General: Skin is warm and dry.   Neurological:      Mental Status: He is alert and oriented to person, place, and time.   Psychiatric:         Mood and Affect: Mood and affect normal.         Behavior: Behavior normal.         Thought Content: Thought content normal.      Result Review :   The following data was reviewed by: FIDEL Gonzalez on 10/03/2023:                  Assessment and Plan    Diagnoses and all orders for this visit:    1. Encounter to establish care (Primary)    2. Heart palpitations  Assessment & Plan:  Continue propanolol 20 mg daily as needed.  Continue follow-up with Cassie.    Orders:  -     CBC (No Diff); Future  -     Comprehensive Metabolic Panel; Future  -     propranolol (INDERAL) 20 MG tablet; Take 1 tablet by mouth Daily As Needed (HEART PALPITATIONS).  Dispense: 90 tablet; Refill: 0    3. Screening for diabetes mellitus (DM)  -     Hemoglobin A1c; Future    4. Screening for cholesterol level  -     Lipid Panel; Future    5. Elevated liver enzymes  -     Comprehensive Metabolic Panel; Future            Follow Up   Return in about 6 months (around 4/3/2024) for Annual physical.  Patient was given instructions and counseling regarding his condition or for health maintenance advice. Please see specific information pulled into the AVS if appropriate.

## 2023-10-03 ENCOUNTER — OFFICE VISIT (OUTPATIENT)
Dept: FAMILY MEDICINE CLINIC | Age: 43
End: 2023-10-03
Payer: COMMERCIAL

## 2023-10-03 VITALS
DIASTOLIC BLOOD PRESSURE: 82 MMHG | HEART RATE: 73 BPM | BODY MASS INDEX: 32.49 KG/M2 | WEIGHT: 214.4 LBS | SYSTOLIC BLOOD PRESSURE: 123 MMHG | HEIGHT: 68 IN

## 2023-10-03 DIAGNOSIS — R74.8 ELEVATED LIVER ENZYMES: ICD-10-CM

## 2023-10-03 DIAGNOSIS — Z76.89 ENCOUNTER TO ESTABLISH CARE: Primary | ICD-10-CM

## 2023-10-03 DIAGNOSIS — Z13.220 SCREENING FOR CHOLESTEROL LEVEL: ICD-10-CM

## 2023-10-03 DIAGNOSIS — Z13.1 SCREENING FOR DIABETES MELLITUS (DM): ICD-10-CM

## 2023-10-03 DIAGNOSIS — R00.2 HEART PALPITATIONS: ICD-10-CM

## 2023-10-03 PROCEDURE — 99203 OFFICE O/P NEW LOW 30 MIN: CPT | Performed by: NURSE PRACTITIONER

## 2023-10-03 RX ORDER — PROPRANOLOL HYDROCHLORIDE 20 MG/1
20 TABLET ORAL DAILY PRN
Qty: 90 TABLET | Refills: 0 | Status: SHIPPED | OUTPATIENT
Start: 2023-10-03

## 2023-10-09 ENCOUNTER — LAB (OUTPATIENT)
Dept: LAB | Facility: HOSPITAL | Age: 43
End: 2023-10-09
Payer: COMMERCIAL

## 2023-10-09 DIAGNOSIS — Z13.1 SCREENING FOR DIABETES MELLITUS (DM): ICD-10-CM

## 2023-10-09 DIAGNOSIS — Z13.220 SCREENING FOR CHOLESTEROL LEVEL: ICD-10-CM

## 2023-10-09 DIAGNOSIS — R00.2 HEART PALPITATIONS: ICD-10-CM

## 2023-10-09 DIAGNOSIS — R74.8 ELEVATED LIVER ENZYMES: ICD-10-CM

## 2023-10-09 LAB
ALBUMIN SERPL-MCNC: 4.6 G/DL (ref 3.5–5.2)
ALBUMIN/GLOB SERPL: 1.9 G/DL
ALP SERPL-CCNC: 70 U/L (ref 39–117)
ALT SERPL W P-5'-P-CCNC: 22 U/L (ref 1–41)
ANION GAP SERPL CALCULATED.3IONS-SCNC: 10.6 MMOL/L (ref 5–15)
AST SERPL-CCNC: 30 U/L (ref 1–40)
BILIRUB SERPL-MCNC: 0.2 MG/DL (ref 0–1.2)
BUN SERPL-MCNC: 14 MG/DL (ref 6–20)
BUN/CREAT SERPL: 11.1 (ref 7–25)
CALCIUM SPEC-SCNC: 9.4 MG/DL (ref 8.6–10.5)
CHLORIDE SERPL-SCNC: 103 MMOL/L (ref 98–107)
CHOLEST SERPL-MCNC: 217 MG/DL (ref 0–200)
CO2 SERPL-SCNC: 24.4 MMOL/L (ref 22–29)
CREAT SERPL-MCNC: 1.26 MG/DL (ref 0.76–1.27)
DEPRECATED RDW RBC AUTO: 42.5 FL (ref 37–54)
EGFRCR SERPLBLD CKD-EPI 2021: 73 ML/MIN/1.73
ERYTHROCYTE [DISTWIDTH] IN BLOOD BY AUTOMATED COUNT: 12.5 % (ref 12.3–15.4)
GLOBULIN UR ELPH-MCNC: 2.4 GM/DL
GLUCOSE SERPL-MCNC: 101 MG/DL (ref 65–99)
HBA1C MFR BLD: 5.5 % (ref 4.8–5.6)
HCT VFR BLD AUTO: 47.7 % (ref 37.5–51)
HDLC SERPL-MCNC: 46 MG/DL (ref 40–60)
HGB BLD-MCNC: 15.8 G/DL (ref 13–17.7)
LDLC SERPL CALC-MCNC: 152 MG/DL (ref 0–100)
LDLC/HDLC SERPL: 3.25 {RATIO}
MCH RBC QN AUTO: 30 PG (ref 26.6–33)
MCHC RBC AUTO-ENTMCNC: 33.1 G/DL (ref 31.5–35.7)
MCV RBC AUTO: 90.7 FL (ref 79–97)
PLATELET # BLD AUTO: 254 10*3/MM3 (ref 140–450)
PMV BLD AUTO: 9.6 FL (ref 6–12)
POTASSIUM SERPL-SCNC: 4.3 MMOL/L (ref 3.5–5.2)
PROT SERPL-MCNC: 7 G/DL (ref 6–8.5)
RBC # BLD AUTO: 5.26 10*6/MM3 (ref 4.14–5.8)
SODIUM SERPL-SCNC: 138 MMOL/L (ref 136–145)
TRIGL SERPL-MCNC: 108 MG/DL (ref 0–150)
VLDLC SERPL-MCNC: 19 MG/DL (ref 5–40)
WBC NRBC COR # BLD: 5.6 10*3/MM3 (ref 3.4–10.8)

## 2023-10-09 PROCEDURE — 83036 HEMOGLOBIN GLYCOSYLATED A1C: CPT

## 2023-10-09 PROCEDURE — 80061 LIPID PANEL: CPT

## 2023-10-09 PROCEDURE — 36415 COLL VENOUS BLD VENIPUNCTURE: CPT

## 2023-10-09 PROCEDURE — 80053 COMPREHEN METABOLIC PANEL: CPT

## 2023-10-09 PROCEDURE — 85027 COMPLETE CBC AUTOMATED: CPT

## 2024-08-13 ENCOUNTER — TELEPHONE (OUTPATIENT)
Dept: FAMILY MEDICINE CLINIC | Age: 44
End: 2024-08-13
Payer: COMMERCIAL

## 2024-08-13 DIAGNOSIS — R00.2 HEART PALPITATIONS: ICD-10-CM

## 2024-08-13 RX ORDER — PROPRANOLOL HYDROCHLORIDE 20 MG/1
20 TABLET ORAL DAILY PRN
Qty: 90 TABLET | Refills: 0 | OUTPATIENT
Start: 2024-08-13

## 2024-08-13 NOTE — TELEPHONE ENCOUNTER
Caller: Sourav Brambila    Relationship: Self    Best call back number: [phone]    Requested Prescriptions:   Requested Prescriptions     Pending Prescriptions Disp Refills    propranolol (INDERAL) 20 MG tablet 90 tablet 0     Sig: Take 1 tablet by mouth Daily As Needed (HEART PALPITATIONS).        Pharmacy where request should be sent: [facility] [id number] - [facility] [place] - [address] [facility] - [phone]  - [phone] FX     Last office visit with prescribing clinician: 10/3/2023   Last telemedicine visit with prescribing clinician: Visit date not found   Next office visit with prescribing clinician: Visit date not found     Additional details provided by patient: PATIENT HAS MORE THAN THREE DAY SUPPLY OF MEDICATION. PLEASE SEND NEW PRESCRIPTION TO PHARMACY WITH REFILLS.    Does the patient have less than a 3 day supply:  [] Yes  [x] No    Would you like a call back once the refill request has been completed: [] Yes [] No    If the office needs to give you a call back, can they leave a voicemail: [] Yes [] No    Kathy Hussein Rep   08/13/24 15:17 EDT

## 2024-08-25 DIAGNOSIS — R00.2 HEART PALPITATIONS: ICD-10-CM

## 2024-08-25 RX ORDER — PROPRANOLOL HYDROCHLORIDE 20 MG/1
20 TABLET ORAL DAILY PRN
Qty: 90 TABLET | Refills: 0 | Status: CANCELLED | OUTPATIENT
Start: 2024-08-25

## 2024-08-27 DIAGNOSIS — R00.2 HEART PALPITATIONS: ICD-10-CM

## 2024-08-27 RX ORDER — PROPRANOLOL HYDROCHLORIDE 20 MG/1
20 TABLET ORAL DAILY PRN
Qty: 30 TABLET | Refills: 0 | Status: SHIPPED | OUTPATIENT
Start: 2024-08-27